# Patient Record
Sex: MALE | Race: ASIAN | NOT HISPANIC OR LATINO | Employment: FULL TIME | URBAN - METROPOLITAN AREA
[De-identification: names, ages, dates, MRNs, and addresses within clinical notes are randomized per-mention and may not be internally consistent; named-entity substitution may affect disease eponyms.]

---

## 2022-06-12 ENCOUNTER — OFFICE VISIT (OUTPATIENT)
Dept: URGENT CARE | Facility: URGENT CARE | Age: 27
End: 2022-06-12
Payer: OTHER MISCELLANEOUS

## 2022-06-12 VITALS
OXYGEN SATURATION: 96 % | HEART RATE: 77 BPM | TEMPERATURE: 98.9 F | DIASTOLIC BLOOD PRESSURE: 74 MMHG | RESPIRATION RATE: 24 BRPM | WEIGHT: 163.4 LBS | BODY MASS INDEX: 23.78 KG/M2 | SYSTOLIC BLOOD PRESSURE: 131 MMHG

## 2022-06-12 DIAGNOSIS — S51.811A FOREARM LACERATION, RIGHT, INITIAL ENCOUNTER: Primary | ICD-10-CM

## 2022-06-12 DIAGNOSIS — Z23 NEED FOR TETANUS BOOSTER: ICD-10-CM
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
## 2022-06-12 PROCEDURE — 90715 TDAP VACCINE 7 YRS/> IM: CPT | Performed by: PHYSICIAN ASSISTANT

## 2022-06-12 PROCEDURE — 12001 RPR S/N/AX/GEN/TRNK 2.5CM/<: CPT | Performed by: PHYSICIAN ASSISTANT

## 2022-06-12 PROCEDURE — 90471 IMMUNIZATION ADMIN: CPT | Performed by: PHYSICIAN ASSISTANT

## 2022-06-12 NOTE — PROGRESS NOTES
Assessment:       ICD-10-CM    1. Forearm laceration, right, initial encounter  S51.811A TDAP VACCINE (Adacel, Boostrix)  [[id number]]     REPAIR SUPERFICIAL, WOUND BODY < =2.5CM   2. Need for tetanus booster  Z23 TDAP VACCINE (Adacel, Boostrix)  [[id number]]     REPAIR SUPERFICIAL, WOUND BODY < =2.5CM   3. Work related injury  Y99.0          PLAN:  Oral consent received.  Wound cleaned with Hibiclens followed by copious amounts of sterile water.  No palpable or visualized glass/foreign body. Laceration was closed using dermabond.  Edges approximated well.  Tolerated well.  Keep uncovered for the next hour.  Then the put a Band-Aid on it and use Ace wrap for comfort and to keep the Band-Aid in place.  May return to work without restrictions other than to keep it covered.  Watch for evidence of infection.  Laceration extremity/skin glue instructions included.  Tetanus update given.  Last tetanus shot greater than 5 years ago.      Ivonne Melgar PA-C    SUBJECTIVE:  27-year-old right-handed male presents for right forearm laceration 1 hour ago.  Works at [employer] in [place].  Was pulling a glass container out of a box and did not realize it was broken and cut his right forearm on the glass edge.  Did inform his supervisor.    No Known Allergies    No past medical history on file.    Multiple Vitamin (DAILY MULTIVITAMIN PO), Take by mouth daily (Patient not taking: Reported on 6/12/2022)  oseltamivir (TAMIFLU) 75 MG capsule, Take 1 capsule (75 mg) by mouth 2 times daily (Patient not taking: Reported on 6/12/2022)    No current facility-administered medications on file prior to visit.        ROS:  SKIN: as above  Musculoskeletal: as above    OBJECTIVE:  Blood pressure 131/74, pulse 77, temperature 98.9  F (37.2  C), temperature source Tympanic, resp. rate 24, weight 74.1 kg (163 lb 6.4 oz), SpO2 96 %.     The patient appears today in no acute distress.  Laceration LOCATION: Right dorsal forearm.  Size of  laceration: 1.5 centimeters  Characteristics of the laceration: flap type  Tendon function intact: yes  Sensation to light touch intact: yes  Pulses intact: yes  Cap refill intact.  Wound clean. No FBs.      Kalyn Melgar PA-C